# Patient Record
Sex: MALE | Race: WHITE | NOT HISPANIC OR LATINO | URBAN - METROPOLITAN AREA
[De-identification: names, ages, dates, MRNs, and addresses within clinical notes are randomized per-mention and may not be internally consistent; named-entity substitution may affect disease eponyms.]

---

## 2020-02-05 ENCOUNTER — EMERGENCY (EMERGENCY)
Facility: HOSPITAL | Age: 65
LOS: 0 days | Discharge: HOME | End: 2020-02-06
Attending: EMERGENCY MEDICINE | Admitting: EMERGENCY MEDICINE
Payer: MEDICARE

## 2020-02-05 VITALS
RESPIRATION RATE: 17 BRPM | HEART RATE: 64 BPM | DIASTOLIC BLOOD PRESSURE: 81 MMHG | TEMPERATURE: 98 F | OXYGEN SATURATION: 97 % | SYSTOLIC BLOOD PRESSURE: 186 MMHG

## 2020-02-05 DIAGNOSIS — M19.90 UNSPECIFIED OSTEOARTHRITIS, UNSPECIFIED SITE: ICD-10-CM

## 2020-02-05 DIAGNOSIS — M54.5 LOW BACK PAIN: ICD-10-CM

## 2020-02-05 DIAGNOSIS — I25.10 ATHEROSCLEROTIC HEART DISEASE OF NATIVE CORONARY ARTERY WITHOUT ANGINA PECTORIS: ICD-10-CM

## 2020-02-05 DIAGNOSIS — R10.9 UNSPECIFIED ABDOMINAL PAIN: ICD-10-CM

## 2020-02-05 DIAGNOSIS — E03.9 HYPOTHYROIDISM, UNSPECIFIED: ICD-10-CM

## 2020-02-05 DIAGNOSIS — I70.0 ATHEROSCLEROSIS OF AORTA: ICD-10-CM

## 2020-02-05 PROCEDURE — 99284 EMERGENCY DEPT VISIT MOD MDM: CPT

## 2020-02-05 RX ORDER — METHOCARBAMOL 500 MG/1
1000 TABLET, FILM COATED ORAL ONCE
Refills: 0 | Status: COMPLETED | OUTPATIENT
Start: 2020-02-05 | End: 2020-02-05

## 2020-02-05 RX ORDER — KETOROLAC TROMETHAMINE 30 MG/ML
30 SYRINGE (ML) INJECTION ONCE
Refills: 0 | Status: DISCONTINUED | OUTPATIENT
Start: 2020-02-05 | End: 2020-02-05

## 2020-02-05 RX ADMIN — Medication 30 MILLIGRAM(S): at 23:10

## 2020-02-06 LAB
APPEARANCE UR: CLEAR — SIGNIFICANT CHANGE UP
BACTERIA # UR AUTO: NEGATIVE — SIGNIFICANT CHANGE UP
BILIRUB UR-MCNC: NEGATIVE — SIGNIFICANT CHANGE UP
COLOR SPEC: YELLOW — SIGNIFICANT CHANGE UP
DIFF PNL FLD: NEGATIVE — SIGNIFICANT CHANGE UP
EPI CELLS # UR: 0 /HPF — SIGNIFICANT CHANGE UP (ref 0–5)
GLUCOSE UR QL: NEGATIVE — SIGNIFICANT CHANGE UP
HYALINE CASTS # UR AUTO: 0 /LPF — SIGNIFICANT CHANGE UP (ref 0–7)
KETONES UR-MCNC: NEGATIVE — SIGNIFICANT CHANGE UP
LEUKOCYTE ESTERASE UR-ACNC: NEGATIVE — SIGNIFICANT CHANGE UP
NITRITE UR-MCNC: NEGATIVE — SIGNIFICANT CHANGE UP
PH UR: 7 — SIGNIFICANT CHANGE UP (ref 5–8)
PROT UR-MCNC: ABNORMAL
RBC CASTS # UR COMP ASSIST: 1 /HPF — SIGNIFICANT CHANGE UP (ref 0–4)
SP GR SPEC: 1.03 — HIGH (ref 1.01–1.02)
UROBILINOGEN FLD QL: SIGNIFICANT CHANGE UP
WBC UR QL: 1 /HPF — SIGNIFICANT CHANGE UP (ref 0–5)

## 2020-02-06 PROCEDURE — 74176 CT ABD & PELVIS W/O CONTRAST: CPT | Mod: 26

## 2020-02-06 RX ORDER — IBUPROFEN 200 MG
1 TABLET ORAL
Qty: 12 | Refills: 0
Start: 2020-02-06 | End: 2020-02-08

## 2020-02-06 RX ORDER — METHOCARBAMOL 500 MG/1
2 TABLET, FILM COATED ORAL
Qty: 24 | Refills: 0
Start: 2020-02-06 | End: 2020-02-08

## 2020-02-06 RX ORDER — ACETAMINOPHEN 500 MG
975 TABLET ORAL ONCE
Refills: 0 | Status: DISCONTINUED | OUTPATIENT
Start: 2020-02-06 | End: 2020-02-06

## 2020-02-06 RX ADMIN — METHOCARBAMOL 1000 MILLIGRAM(S): 500 TABLET, FILM COATED ORAL at 01:55

## 2020-02-06 NOTE — ED PROVIDER NOTE - NS ED ROS FT
Constitutional: No fever, chills, unintended weight loss.  Eyes:  No visual changes, eye pain or discharge.  ENMT:  No hearing changes, pain, no sore throat or runny nose, no difficulty swallowing  Cardiac:  No chest pain, SOB or edema. No chest pain with exertion.  Respiratory:  No cough or respiratory distress. No hemoptysis. No history of asthma or RAD.  GI:  No nausea, vomiting, diarrhea or abdominal pain.  :  No dysuria, frequency or burning.  MS:  see hpi  Neuro:  No headache or weakness.  No LOC.  Skin:  No skin rash.   Endocrine: +hypothyroidism, no diabetes.

## 2020-02-06 NOTE — ED PROVIDER NOTE - CARE PROVIDER_API CALL
Angela,   your PCP/Endocrinologist Dr. Miller in [place]  Phone: (   )    -  Fax: (   )    -  Established Patient  Follow Up Time: 7-10 Days    [facility],   your Physical Therapist and Orthopedist at [facility] in [place]  Phone: (   )    -  Fax: (   )    -  Established Patient  Follow Up Time: 7-10 Days    Harvey Desai)  Surgery; Vascular Surgery  [address] [place] [address], [address]  [place] [address]  Phone: [phone]  Fax: [phone]  Follow Up Time: 7-10 Days

## 2020-02-06 NOTE — ED PROVIDER NOTE - MDM ORDERS SUBMITTED SELECTION
YOUR BABY AT BIRTH TO 2 WEEKS OF AGE    Key points at this age…     • Breast milk is the best nutrition.    • Car seats save lives--make sure to install and use them correctly!    • “Back to sleep” is recommended--it’s the safest sleeping position for your young infant!    FEEDING: Breast milk is the best food for Lacey at this age; it is beneficial in many ways to both babies and moms! It can be challenging early on, so ask for help from your doctors and the lactation specialists at the hospital. Breastfeeding moms should make sure their doctors know about any medications they are taking.     If you are bottle feeding, make sure to prepare formula exactly as directed (standard formula preparation is one scoop of formula in 2 ounces of water). You should hold your baby upright (not lying down) while feeding and never prop the bottle so that it stays in your baby’s mouth without you holding it (this can cause dangerous choking episodes as well as ear infections).     ABOUT VITAMIN D: All babies ( or formula fed) need extra vitamin D. Vitamin D is very important for bone health (especially preventing rickets) as well as other important health aspects. Breast milk is the “perfect” food except that it does not contain enough vitamin D for your baby. Formula has some vitamin D in it, but your baby needs extra until they are taking ~32-33 ounces of formula daily. We recommend 400 IU of vitamin D once daily. Vitamin D drops are available in two forms-- a concentrated drop which provides the full dose in one DROP or a liquid product which is dosed at one ml (or one cc-- the syringes provided with the drops are labeled with the correct dose). Make sure to follow the instructions on the bottle exactly for proper dosing! A  baby should continue this for as long as they are primarily breastfeeding; by the time a formula-fed baby goes through one bottle of vitamin D drops they are likely getting  enough vitamin D in their formula.     BEHAVIOR & CRYING: Touch and hold and carry your baby as often as you can. Cuddling, caressing, talking and singing to your baby makes them feel secure and loved. You will not spoil them when they are this young! Be sure to focus on them when you are feeding them; this is an important time for bonding and social development (don't be distracted by your cell phone or computer-- focus on your baby!). It’s also important to remember that other children in the house may be jealous of the new baby, and sometimes they may act out to get your attention. Try to set aside short periods of time every day devoted exclusively to your older child (or children). Also, try to find simple ways that they can help care for the baby-- this will help them connect better.      CAR SAFETY:  Your baby should be secured in a rear-facing infant car safety seat whenever riding in the car for at least the first year of life.This will protect your baby in case of an accident, plus it is the law. When choosing a car seat, you can check on any safety concerns for a particular model at www.safercar.gov. You can also search for local inspection stations on that site. (Or at www.safekidswi.org.) Correct installation of the car seat is critical (at least 7 out of 10 car seats are estimated to be incorrectly installed or used!). A certified car seat  can ensure your baby is as safely secured as possible. You should also register your car seat with the , so that you will be notified of any future problems or recalls with that seat. The straps need to be very snug to protect your baby in case of an accident (so no thick coats or snowsuits while riding in the car during the winter!). Never leave a child alone in a car, even for a very short time.      SAFE SLEEPING: The safest place for a  to sleep is in their own crib in their parent’s room; this is recommended until at least 6 months  of age. They should always be placed on their back to sleep (not on their tummy or their side). This “back to sleep” position decreases the risk of crib death (SIDS). Avoid loose, soft bedding (blankets, comforters, sheepskins, quilts, pillows, pillow-like bumper pads) and soft toys in the baby’s crib because they are a risk for suffocation (and SIDS). “Sleep sacks” and swaddling with thin blankets are okay. Cribs (including Pack-n-Plays) should be certified by [employer] [facility] (a safety agency for baby products). Safety criteria for cribs include: slats or bars no more than 2-3/8 inches (6cm) apart, a snug-fitting mattress, and a distance of no less than 26 inches from the mattress to the top rail. Sleeping in or with other devices (car seats, swings, bed sharing devices, cushions) are not recommended; likewise, sleeping on sofas or in armchairs is very dangerous for small infants.     TUMMY TIME: It is safe to start “tummy time” as soon as you bring your baby home. (Laying on your chest or across your lap counts!) It should always be done when your baby is awake. Start with a few minutes at a time, and increase it as your baby grows older. (Never leave your baby alone on their tummy.) Tummy time strengthens their neck and shoulder muscles and helps with their development. (It also prevents lopsided or flat heads that come from lying in the same position on their back all the time.)    OTHER SAFETY GUIDELINES:  Burns:  Adjust your hot-water heater to 120-125°F or use the “low” setting. This will decrease the risk of scald burns (and save money on your utility bills!).     Smoke and carbon monoxide detectors: Make sure that detectors are on each level of your home, especially near sleeping areas. Check the batteries regularly and replace them at least least twice a year. Discuss a “fire escape plan” with all family members.     No smoking! Exposure to tobacco smoke puts children at risk for lots of problems (asthma and other  severe breathing problems, crib death [SIDS], ear infections and even behavior and learning problems). If you smoke, this is the time to talk to your doctor about quitting. If you can’t quit, keep all smoking (including e-cigarettes) outside the home (not just in another room), and all smokers should change their clothes and wash their hands before handling the baby.     Tap water is safe! Tap water is safe for formula preparation. News reports in 2016 raised concerns about a risk of lead in the [place] city water supply. This water supply is very safe--there is no lead in the water that leaves the local water treatment center. In some older homes (built in ), there may be lead in the service pipe lines (which carry water from the main water pipe to individual homes). When water sits in these pipes for prolonged periods, some lead may dissolve into the water. As a precaution, the [facility] recommends a water filter at the tap of homes which have lead service lines. If you are not sure when your home was built, do an internet search for \"[place] lead awareness and drinking water safety\". From this web page, you can search for your address to find out if your home was built with lead service lines. There are also helpful hints regarding water safety on this site. Another option is to call the Customer Service line at [phone].    OTHER  ISSUES:   Body temperature: A rectal temperature is most accurate way to check for fever in this age group. Normal rectal temperatures range between 97.9 and 100.3°F. A fever is defined as a rectal temperature of 100.4°F or higher. Call the doctor or have Lacey seen if they have a fever. In a baby this young, a fever needs to be evaluated. Do not give Tylenol or ibuprofen at this age.     Bowel movements:  Once your baby is feeding well, how often they poop may vary from once every feeding to once every three to four days ( babies often poop less  than once a day).  It is normal for babies to strain and turn a little red in the face with bowel movements, as long as the stool they pass is soft.  If you feel Lacey is very uncomfortable, call the clinic. Blood in the stool may be normal early on in nursing infants, but you should call us to discuss it if you do see any blood.    Follow up visits: After the  period, we usually recommend your baby be seen at 2 weeks of age, and then at two months of age. Of course, we will see you anytime for any concerns and to follow up on any problems.     Thank you for entrusting your care to [facility]!    Also, check out “Children’s Health” on the [facility] Blog for updates on timely topics regarding children’s health!             Medications/Imaging Studies

## 2020-02-06 NOTE — ED PROVIDER NOTE - CARE PLAN
Principal Discharge DX:	Flank pain  Secondary Diagnosis:	Coronary artery calcification  Secondary Diagnosis:	Atherosclerosis of aorta  Secondary Diagnosis:	Arthritis

## 2020-02-06 NOTE — ED ADULT NURSE NOTE - OBJECTIVE STATEMENT
pt aox4 complaining of left sided flank pain. ua/ uc sent. pt given medications as per order. VSS ok for dc with follow up.

## 2020-02-06 NOTE — ED PROVIDER NOTE - PROVIDER TOKENS
FREE:[LAST:[[facility]],PHONE:[(   )    -],FAX:[(   )    -],ADDRESS:[your PCP/Endocrinologist Dr. Miller in [place]],FOLLOWUP:[7-10 Days],ESTABLISHEDPATIENT:[T]],FREE:[LAST:[[facility]],PHONE:[(   )    -],FAX:[(   )    -],ADDRESS:[your Physical Therapist and Orthopedist at [facility] in [place]],FOLLOWUP:[7-10 Days],ESTABLISHEDPATIENT:[T]],PROVIDER:[TOKEN:[[id number]],FOLLOWUP:[7-10 Days]]

## 2020-02-06 NOTE — ED PROVIDER NOTE - CARE PROVIDERS DIRECT ADDRESSES
,DirectAddress_Unknown,DirectAddress_Unknown,[email or web address]

## 2020-02-06 NOTE — ED PROVIDER NOTE - OBJECTIVE STATEMENT
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 Mid/lower L back pain, aching, worse w/mvmt, relieved by rest. Admits to some heavy lifting lately, however was concerned he might have a kidney stone. No h/o same. No f/c, nv, abd pain, urinary sx, saddle anesthesia, b/b incontinence, focal numbness or weakness. PMD in [place].

## 2020-02-06 NOTE — ED PROVIDER NOTE - PHYSICAL EXAMINATION
VITAL SIGNS: I have reviewed nursing notes and confirm.  CONSTITUTIONAL: Well-developed; well-nourished; in no acute distress.  SKIN: Skin exam is warm and dry, no acute rash.  HEAD: Normocephalic; atraumatic.  EYES: PERRL, EOM intact; conjunctiva and sclera clear.  ENT: No nasal discharge; airway clear.  NECK: Supple; non tender.  CARD: S1, S2 normal; no murmurs, gallops, or rubs. Regular rate and rhythm.  RESP: No wheezes, rales or rhonchi.  ABD: Normal bowel sounds; soft; non-distended; non-tender; no hepatosplenomegaly; no rgr.  BACK: +L paraT/L spine ttp, no midline ttp, no CVAT  EXT: Normal ROM. No clubbing, cyanosis or edema. DPI.  NEURO: aaox3, cn 2-12 intact bl no nystagmus or facial droop, 5/5 strength x 4 no drift, gross sensation intact, finger-nose nl, gait nl, romberg neg   PSYCH: Cooperative, appropriate.

## 2020-02-06 NOTE — ED PROVIDER NOTE - CLINICAL SUMMARY MEDICAL DECISION MAKING FREE TEXT BOX
L flank pain, reproducible, ua no hematuria, CT AP with degenerative spine & other incidental findings, analgesia provided w/improvement - all results d/w pt & copies given, strict return precautions discussed, rec outpt private PT/Ortho f/u as established, PMD f/u, Cardio/Vasc referrals given for incidental atherosclerosis findings on CT

## 2020-02-06 NOTE — ED PROVIDER NOTE - PATIENT PORTAL LINK FT
You can access the FollowMyHealth Patient Portal offered by [facility] by registering at the following website: [email or web address]. By joining [facility]’s FollowMyHealth portal, you will also be able to view your health information using other applications (apps) compatible with our system.

## 2020-02-06 NOTE — ED PROVIDER NOTE - NSFOLLOWUPCLINICS_GEN_ALL_ED_FT
[facility] [address] [address]  Phone: [phone]  Fax:   Follow Up Time: 7-10 Days

## 2020-02-07 LAB
CULTURE RESULTS: NO GROWTH — SIGNIFICANT CHANGE UP
SPECIMEN SOURCE: SIGNIFICANT CHANGE UP

## 2021-07-31 ENCOUNTER — EMERGENCY (EMERGENCY)
Facility: HOSPITAL | Age: 66
LOS: 0 days | Discharge: HOME | End: 2021-07-31
Attending: EMERGENCY MEDICINE | Admitting: EMERGENCY MEDICINE
Payer: MEDICARE

## 2021-07-31 VITALS
WEIGHT: 195.11 LBS | HEART RATE: 76 BPM | OXYGEN SATURATION: 98 % | SYSTOLIC BLOOD PRESSURE: 161 MMHG | TEMPERATURE: 98 F | DIASTOLIC BLOOD PRESSURE: 100 MMHG | RESPIRATION RATE: 18 BRPM

## 2021-07-31 DIAGNOSIS — Z87.19 PERSONAL HISTORY OF OTHER DISEASES OF THE DIGESTIVE SYSTEM: ICD-10-CM

## 2021-07-31 DIAGNOSIS — R10.12 LEFT UPPER QUADRANT PAIN: ICD-10-CM

## 2021-07-31 DIAGNOSIS — E03.9 HYPOTHYROIDISM, UNSPECIFIED: ICD-10-CM

## 2021-07-31 DIAGNOSIS — R14.2 ERUCTATION: ICD-10-CM

## 2021-07-31 DIAGNOSIS — F17.210 NICOTINE DEPENDENCE, CIGARETTES, UNCOMPLICATED: ICD-10-CM

## 2021-07-31 DIAGNOSIS — Z85.72 PERSONAL HISTORY OF NON-HODGKIN LYMPHOMAS: ICD-10-CM

## 2021-07-31 LAB
ALBUMIN SERPL ELPH-MCNC: 5.1 G/DL — SIGNIFICANT CHANGE UP (ref 3.5–5.2)
ALP SERPL-CCNC: 124 U/L — HIGH (ref 30–115)
ALT FLD-CCNC: 31 U/L — SIGNIFICANT CHANGE UP (ref 0–41)
ANION GAP SERPL CALC-SCNC: 11 MMOL/L — SIGNIFICANT CHANGE UP (ref 7–14)
AST SERPL-CCNC: 26 U/L — SIGNIFICANT CHANGE UP (ref 0–41)
BASOPHILS # BLD AUTO: 0.07 K/UL — SIGNIFICANT CHANGE UP (ref 0–0.2)
BASOPHILS NFR BLD AUTO: 0.6 % — SIGNIFICANT CHANGE UP (ref 0–1)
BILIRUB SERPL-MCNC: 0.4 MG/DL — SIGNIFICANT CHANGE UP (ref 0.2–1.2)
BUN SERPL-MCNC: 22 MG/DL — HIGH (ref 10–20)
CALCIUM SERPL-MCNC: 10.6 MG/DL — HIGH (ref 8.5–10.1)
CHLORIDE SERPL-SCNC: 105 MMOL/L — SIGNIFICANT CHANGE UP (ref 98–110)
CO2 SERPL-SCNC: 27 MMOL/L — SIGNIFICANT CHANGE UP (ref 17–32)
CREAT SERPL-MCNC: 0.9 MG/DL — SIGNIFICANT CHANGE UP (ref 0.7–1.5)
EOSINOPHIL # BLD AUTO: 0.12 K/UL — SIGNIFICANT CHANGE UP (ref 0–0.7)
EOSINOPHIL NFR BLD AUTO: 1 % — SIGNIFICANT CHANGE UP (ref 0–8)
GLUCOSE SERPL-MCNC: 114 MG/DL — HIGH (ref 70–99)
HCT VFR BLD CALC: 51.9 % — SIGNIFICANT CHANGE UP (ref 42–52)
HGB BLD-MCNC: 17.4 G/DL — SIGNIFICANT CHANGE UP (ref 14–18)
IMM GRANULOCYTES NFR BLD AUTO: 0.4 % — HIGH (ref 0.1–0.3)
LIDOCAIN IGE QN: 24 U/L — SIGNIFICANT CHANGE UP (ref 7–60)
LYMPHOCYTES # BLD AUTO: 26.6 % — SIGNIFICANT CHANGE UP (ref 20.5–51.1)
LYMPHOCYTES # BLD AUTO: 3.17 K/UL — SIGNIFICANT CHANGE UP (ref 1.2–3.4)
MCHC RBC-ENTMCNC: 30.3 PG — SIGNIFICANT CHANGE UP (ref 27–31)
MCHC RBC-ENTMCNC: 33.5 G/DL — SIGNIFICANT CHANGE UP (ref 32–37)
MCV RBC AUTO: 90.3 FL — SIGNIFICANT CHANGE UP (ref 80–94)
MONOCYTES # BLD AUTO: 1.05 K/UL — HIGH (ref 0.1–0.6)
MONOCYTES NFR BLD AUTO: 8.8 % — SIGNIFICANT CHANGE UP (ref 1.7–9.3)
NEUTROPHILS # BLD AUTO: 7.44 K/UL — HIGH (ref 1.4–6.5)
NEUTROPHILS NFR BLD AUTO: 62.6 % — SIGNIFICANT CHANGE UP (ref 42.2–75.2)
NRBC # BLD: 0 /100 WBCS — SIGNIFICANT CHANGE UP (ref 0–0)
PLATELET # BLD AUTO: 197 K/UL — SIGNIFICANT CHANGE UP (ref 130–400)
POTASSIUM SERPL-MCNC: 5.1 MMOL/L — HIGH (ref 3.5–5)
POTASSIUM SERPL-SCNC: 5.1 MMOL/L — HIGH (ref 3.5–5)
PROT SERPL-MCNC: 7.8 G/DL — SIGNIFICANT CHANGE UP (ref 6–8)
RBC # BLD: 5.75 M/UL — SIGNIFICANT CHANGE UP (ref 4.7–6.1)
RBC # FLD: 14.9 % — HIGH (ref 11.5–14.5)
SODIUM SERPL-SCNC: 143 MMOL/L — SIGNIFICANT CHANGE UP (ref 135–146)
WBC # BLD: 11.9 K/UL — HIGH (ref 4.8–10.8)
WBC # FLD AUTO: 11.9 K/UL — HIGH (ref 4.8–10.8)

## 2021-07-31 PROCEDURE — 99284 EMERGENCY DEPT VISIT MOD MDM: CPT

## 2021-07-31 PROCEDURE — 74022 RADEX COMPL AQT ABD SERIES: CPT | Mod: 26

## 2021-07-31 RX ORDER — FAMOTIDINE 10 MG/ML
20 INJECTION INTRAVENOUS ONCE
Refills: 0 | Status: COMPLETED | OUTPATIENT
Start: 2021-07-31 | End: 2021-07-31

## 2021-07-31 RX ORDER — DIPHENHYDRAMINE HYDROCHLORIDE AND LIDOCAINE HYDROCHLORIDE AND ALUMINUM HYDROXIDE AND MAGNESIUM HYDRO
30 KIT ONCE
Refills: 0 | Status: COMPLETED | OUTPATIENT
Start: 2021-07-31 | End: 2021-07-31

## 2021-07-31 RX ORDER — SODIUM CHLORIDE 9 MG/ML
1000 INJECTION INTRAMUSCULAR; INTRAVENOUS; SUBCUTANEOUS ONCE
Refills: 0 | Status: COMPLETED | OUTPATIENT
Start: 2021-07-31 | End: 2021-07-31

## 2021-07-31 RX ADMIN — FAMOTIDINE 100 MILLIGRAM(S): 10 INJECTION INTRAVENOUS at 20:50

## 2021-07-31 RX ADMIN — DIPHENHYDRAMINE HYDROCHLORIDE AND LIDOCAINE HYDROCHLORIDE AND ALUMINUM HYDROXIDE AND MAGNESIUM HYDRO 30 MILLILITER(S): KIT at 20:55

## 2021-07-31 RX ADMIN — SODIUM CHLORIDE 1000 MILLILITER(S): 9 INJECTION INTRAMUSCULAR; INTRAVENOUS; SUBCUTANEOUS at 20:51

## 2021-07-31 NOTE — ED PROVIDER NOTE - PHYSICAL EXAMINATION
CONSTITUTIONAL: Well-appearing; well-nourished; in no apparent distress.   EYES: PERRL; EOM intact.   CARDIOVASCULAR: Normal S1, S2; no murmurs, rubs, or gallops.   RESPIRATORY: Normal chest excursion with respiration; breath sounds clear and equal bilaterally; no wheezes, rhonchi, or rales.  GI/: Normal bowel sounds; soft; non-distended; non-tender; no palpable organomegaly.   MS: No calf swelling and tenderness.  SKIN: Normal for age and race; warm; dry; good turgor; no apparent lesions or exudate.   NEURO/PSYCH: A & O x 4; grossly unremarkable.

## 2021-07-31 NOTE — ED PROVIDER NOTE - CLINICAL SUMMARY MEDICAL DECISION MAKING FREE TEXT BOX
Labs unremarkable.  AXR non-obstructive pattern.  Asymptomatic in ED. Abdomen soft and non-tender.  Will D/C to follow up with PCP.  Instructed to return if symptoms worsen.

## 2021-07-31 NOTE — ED PROVIDER NOTE - PROGRESS NOTE DETAILS
Feeling better. abdomen is soft and non-tender. xray found bowel gas throughout without distension. no re-exam - prior to dc continue to be soft and non-tender without distension. encourage return to ED for worsening sxs.

## 2021-07-31 NOTE — ED PROVIDER NOTE - NS ED MD DISPO DISCHARGE CCDA
Patient/Caregiver provided printed discharge information.
hand grasp, leg strength strong and equal bilaterally

## 2021-07-31 NOTE — ED PROVIDER NOTE - PATIENT PORTAL LINK FT
You can access the FollowMyHealth Patient Portal offered by [facility] by registering at the following website: [email or web address]. By joining [facility]’s FollowMyHealth portal, you will also be able to view your health information using other applications (apps) compatible with our system.

## 2021-07-31 NOTE — ED PROVIDER NOTE - OBJECTIVE STATEMENT
65 yo male hx of hypothyroid/ B-cell lymphoma (s/p chest and abdominal tumor resection) present c/o LUQ pain for few hours. reports he changed to Akin's diet and has been a lot of Figs in his diet. felt LUQ pain/pressure tonight and didn't get improve so he came to ED for evaluation. reported he had multiple burping prior to arrival and his pain has improved to evaluation. denies fever/chill/chest pain/sob/vomiting/diarrhea. has been passing gas and moved his bowel earlier in the afternoon.

## 2021-07-31 NOTE — ED PROVIDER NOTE - ATTENDING CONTRIBUTION TO CARE
I personally evaluated the patient. I reviewed the Resident’s or Physician Assistant’s note (as assigned above), and agree with the findings and plan except as documented in my note.  Chart reviewed.  H/O lymphoma, bowel obstruction S/P resection, presents with LUQ pain and belching after eating figs.  Feels better now.  Last BM today.  Exam shows alert patient in no distress, HEENT NCAT, lungs clear, RR S1S2, abdomen soft NT non-distended, +BS, no rebound or guarding, no CCE.

## 2021-11-03 NOTE — ED PROVIDER NOTE - IV ALTEPLASE DOOR HIDDEN
Chang Nicole: still has diminished breath sounds on right, no symptoms though. Do you want to get a CXR? He had pneumonia over the summer.
show

## 2022-07-17 ENCOUNTER — EMERGENCY (EMERGENCY)
Facility: HOSPITAL | Age: 67
LOS: 0 days | Discharge: HOME | End: 2022-07-17
Attending: EMERGENCY MEDICINE | Admitting: EMERGENCY MEDICINE

## 2022-07-17 VITALS
DIASTOLIC BLOOD PRESSURE: 76 MMHG | OXYGEN SATURATION: 98 % | TEMPERATURE: 98 F | SYSTOLIC BLOOD PRESSURE: 148 MMHG | HEART RATE: 71 BPM | RESPIRATION RATE: 18 BRPM

## 2022-07-17 VITALS — WEIGHT: 175.05 LBS

## 2022-07-17 DIAGNOSIS — K59.00 CONSTIPATION, UNSPECIFIED: ICD-10-CM

## 2022-07-17 DIAGNOSIS — R10.9 UNSPECIFIED ABDOMINAL PAIN: ICD-10-CM

## 2022-07-17 DIAGNOSIS — Z85.72 PERSONAL HISTORY OF NON-HODGKIN LYMPHOMAS: ICD-10-CM

## 2022-07-17 DIAGNOSIS — F17.200 NICOTINE DEPENDENCE, UNSPECIFIED, UNCOMPLICATED: ICD-10-CM

## 2022-07-17 DIAGNOSIS — E03.9 HYPOTHYROIDISM, UNSPECIFIED: ICD-10-CM

## 2022-07-17 PROCEDURE — 99283 EMERGENCY DEPT VISIT LOW MDM: CPT | Mod: FS

## 2022-07-17 RX ORDER — LIOTHYRONINE SODIUM 25 UG/1
0 TABLET ORAL
Qty: 0 | Refills: 0 | DISCHARGE

## 2022-07-17 RX ORDER — THYROID 120 MG
1 TABLET ORAL
Qty: 0 | Refills: 0 | DISCHARGE

## 2022-07-17 NOTE — ED PROVIDER NOTE - OBJECTIVE STATEMENT
67-year-old male history of lymphoma, intestinal obstructions complaining of constipation and abdominal pain.  Patient states that he has been on a keto diet for the past year and sometimes has constipation.  Today patient was unable to have a bowel movement and took Milk of Magnesia  and had a Fleet enema. He  was still unable to have a bowel movement at home so he came to the ED.  While in ED patient had a  large bowel movement and feels much better and is ready to go home.

## 2022-07-17 NOTE — ED PROVIDER NOTE - NS ED ATTENDING STATEMENT MOD
This was a shared visit with the VINAY. I reviewed and verified the documentation and independently performed the documented:

## 2022-07-17 NOTE — ED PROVIDER NOTE - NS ED ROS FT
Review of Systems    Constitutional: (-) fever, (-) chills  Eyes/ENT: (-) blurry vision, (-) epistaxis, (-) sore throat  Cardiovascular: (-) chest pain, (-) syncope  Respiratory: (-) cough, (-) shortness of breath  Gastrointestinal: (+) pain, (-) nausea, (-) vomiting, (-) diarrhea, (+) constipation  Musculoskeletal: (-) neck pain, (-) back pain, (-) body aches  Integumentary: (-) rash, (-) edema  Neurological: (-) headache, (-) altered mental status

## 2022-07-17 NOTE — ED PROVIDER NOTE - PATIENT PORTAL LINK FT
You can access the FollowMyHealth Patient Portal offered by [facility] by registering at the following website: [email or web address]. By joining [employer]’s FollowMyHealth portal, you will also be able to view your health information using other applications (apps) compatible with our system.

## 2022-07-17 NOTE — ED PROVIDER NOTE - CLINICAL SUMMARY MEDICAL DECISION MAKING FREE TEXT BOX
Patient presented with abdominal pain and constipation which she states has been going on for years.  States today he was having some issues using the bathroom and then took milk of magnesia which did not work so he came to the ED.  On arrival to ED, patient use the bathroom and was able to have a large bowel movement and now is completely asymptomatic.  Afebrile, hemodynamically stable, abdomen completely nontender.  Patient ambulatory and requesting to leave at this time.  Given the above, will discharge home with outpatient follow up. Patient agreeable with plan. Agrees to return to ED for any new or worsening symptoms.

## 2024-07-25 ENCOUNTER — EMERGENCY (EMERGENCY)
Facility: HOSPITAL | Age: 69
LOS: 1 days | Discharge: HOME | End: 2024-07-25
Attending: EMERGENCY MEDICINE | Admitting: EMERGENCY MEDICINE
Payer: MEDICARE

## 2024-07-25 VITALS
TEMPERATURE: 98 F | HEART RATE: 76 BPM | DIASTOLIC BLOOD PRESSURE: 79 MMHG | OXYGEN SATURATION: 99 % | SYSTOLIC BLOOD PRESSURE: 154 MMHG | RESPIRATION RATE: 16 BRPM

## 2024-07-25 VITALS
HEART RATE: 84 BPM | OXYGEN SATURATION: 96 % | RESPIRATION RATE: 16 BRPM | DIASTOLIC BLOOD PRESSURE: 116 MMHG | TEMPERATURE: 98 F | SYSTOLIC BLOOD PRESSURE: 171 MMHG

## 2024-07-25 DIAGNOSIS — Z23 ENCOUNTER FOR IMMUNIZATION: ICD-10-CM

## 2024-07-25 DIAGNOSIS — Z88.5 ALLERGY STATUS TO NARCOTIC AGENT: ICD-10-CM
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
## 2024-07-25 DIAGNOSIS — S61.212A LACERATION WITHOUT FOREIGN BODY OF RIGHT MIDDLE FINGER WITHOUT DAMAGE TO NAIL, INITIAL ENCOUNTER: ICD-10-CM

## 2024-07-25 DIAGNOSIS — Z85.72 PERSONAL HISTORY OF NON-HODGKIN LYMPHOMAS: ICD-10-CM
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
## 2024-07-25 PROCEDURE — 12002 RPR S/N/AX/GEN/TRNK2.6-7.5CM: CPT

## 2024-07-25 PROCEDURE — 99284 EMERGENCY DEPT VISIT MOD MDM: CPT | Mod: 25

## 2024-07-25 PROCEDURE — 73140 X-RAY EXAM OF FINGER(S): CPT | Mod: 26,RT

## 2024-07-25 RX ORDER — TETANUS TOXOID, REDUCED DIPHTHERIA TOXOID AND ACELLULAR PERTUSSIS VACCINE, ADSORBED 5; 2.5; 8; 8; 2.5 [IU]/.5ML; [IU]/.5ML; UG/.5ML; UG/.5ML; UG/.5ML
0.5 SUSPENSION INTRAMUSCULAR ONCE
Refills: 0 | Status: COMPLETED | OUTPATIENT
Start: 2024-07-25 | End: 2024-07-25

## 2024-07-25 RX ORDER — CEFADROXIL 500 MG
1 CAPSULE ORAL
Qty: 5 | Refills: 0
Start: 2024-07-25 | End: 2024-07-29

## 2024-07-25 RX ADMIN — TETANUS TOXOID, REDUCED DIPHTHERIA TOXOID AND ACELLULAR PERTUSSIS VACCINE, ADSORBED 0.5 MILLILITER(S): 5; 2.5; 8; 8; 2.5 SUSPENSION INTRAMUSCULAR at 13:54

## 2024-07-25 NOTE — ED ADULT NURSE REASSESSMENT NOTE - NS ED NURSE REASSESS COMMENT FT1
Pt dc and all education given and gone over with pt. Pt has no further questions and self ambulating out of ed with all belongings and ppwrk. Verbal confirmation that pt understands  All information including refferals, resources, recomendations and  medications (if applicable) gone over with pateint.  Pt not with any current complaints and reports feeling great,   Care complete
RN spoke with Anil ROQUE, pt affected finger soaked in solution of saline and iodine  Pt medicated as ordered. All medication education given to patient and pt understands. Plan of care ongoing  Pt in chair, in nad, no current complaints  Plan of care ongoing
Hrvoje

## 2024-07-25 NOTE — ED PROVIDER NOTE - NSFOLLOWUPINSTRUCTIONS_ED_ALL_ED_FT
You were seen in the ED for finger laceration    Your evaluation including x ray showed no findings requiring further evaluation or treatment in the hospital at this time.    Your laceration was repaired with Steri-Strips and 4 sutures.  Please keep these clean, and in the splint as you have been shown.  You may have a small fracture at the base of the bone for which you should wear the splint.    Please follow up with your PCP as discussed within 1 week. Discuss your symptoms, medications, and results in this packet.    Please also follow-up with hand surgeon Dr. Amato.  See the contact information included below to schedule an appointment within 5 days.    You are prescribed an antibiotic to take called cefadroxil.  Please take 1 tablet once per day for 5 days.    Seek immediate medical attention if you experience new or worsening symptoms, including New numbness or tingling significant bleeding or pus drainage from the wound, fevers with worsening pain and swelling to the wound, inability to feel or move the affected finger.

## 2024-07-25 NOTE — ED PROVIDER NOTE - PATIENT PORTAL LINK FT
You can access the FollowMyHealth Patient Portal offered by [facility] by registering at the following website: [email or web address]. By joining [facility]’s FollowMyHealth portal, you will also be able to view your health information using other applications (apps) compatible with our system.

## 2024-07-25 NOTE — ED PROVIDER NOTE - PROGRESS NOTE DETAILS
Juliano Harris MD PGY3: Xray read as possible small avulsion fracture to base of DIP. Discussed with hand, repaired as recommended with suture and steri strips. To give abx as recommended. Splinted finger. Discussed with patient all results including any incidentals. Discussed discharge, follow up, return precautions, medications. Patient verbalizes understanding and is amenable at this time. Answered patient questions.

## 2024-07-25 NOTE — ED ADULT NURSE NOTE - OBJECTIVE STATEMENT
Pt presents to ed, ambulatory Pt AOX4, speaking in full clear sentences, breathing equal and unlabored  Pt with laceration to right 3rd digit from a saw 40 min PTA. Pt unsure of last tdap. No AC use. Bleeding controlled at present. Pt hypertensive, states he is very stressed right now. Hx hypothyroid, on synthroid  No other HAM reported but reports after right lung tumor resection surgery 3 months ago he developed HTN but currently does not take meds  Neg SI/HI  No further DCAPBTLS reported or observed  Plan of care ongoing

## 2024-07-25 NOTE — ED PROVIDER NOTE - CARE PROVIDERS DIRECT ADDRESSES
[FreeTextEntry1] : Documented by Manpreet Awan acting as a scribe for Dr. Koko Montoya on 10/14/2021. All medical record entries made by the Scribe were at my, Dr. Koko Montoya, direction and personally dictated by me on 10/14/2021 . I have reviewed the chart and agree that the record accurately reflects my personal performance of the history, physical exam, assessment and plan. I have also personally directed, reviewed, and agreed with the chart.  [email or web address]

## 2024-07-25 NOTE — ED PROVIDER NOTE - OBJECTIVE STATEMENT
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  Patient is left-handed.  Approximately 1 hour prior to arrival patient was helping his friend cut wood with a reciprocating saw, hit his right middle finger on the nonsharp part of the blade.  Had immediate pain and bleeding from that site.  States that his pain has been improved, no analgesia taken.  Still able to move the finger to flexion extension and feel the finger.  No injuries elsewhere.  Unknown last tetanus.  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

## 2024-07-25 NOTE — ED PROVIDER NOTE - CARE PROVIDER_API CALL
Pj Amato  Plastic Surgery  [address], [address]  [place] [address]  Phone: [phone]  Fax: [phone]  Follow Up Time: 4-6 Days

## 2024-07-25 NOTE — ED PROVIDER NOTE - CLINICAL SUMMARY MEDICAL DECISION MAKING FREE TEXT BOX
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 With vital signs initially hypertensive to 170s over 110s with patient mildly anxious, otherwise within norm limits and stable.  Presenting with right third finger laceration that is fairly deep but not involving the nail and without any findings suggestive of flexor or extensor injury or neurovascular compromise.  To update tetanus, x-ray given wound is fairly deep, soaked in saline and Betadine, repair wound. Declines analgesia at this time.

## 2024-07-25 NOTE — ED PROVIDER NOTE - ATTENDING CONTRIBUTION TO CARE
Trey Jeff is a 69-year-old male who was seen & examined w/ the resident.  The patient presented with a laceration to his right middle finger. 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 Approximately an hour ago, while assisting a friend in cutting wood, he accidentally injured his right middle finger with the back of a reciprocating saw, resulting in a laceration. Initially, he experienced bleeding and pain, but both have subsided slightly since the incident. He reports being able to feel and move the affected finger appropriately. His last tetanus shot is unknown.     On our exam, there is a 3 cm curvilinear laceration to lateral aspect of 3rd right finger pad just anterior to nail bed without nail involvement

## 2024-07-25 NOTE — ED PROVIDER NOTE - PHYSICAL EXAMINATION
CONSTITUTIONAL: awake, mildly anxious, in no acute distress.   SKIN: 3cm curvilinear laceration to lateral aspect of 3rd R finger pad just anterior to nail bed without nail involvement  HEAD: Normocephalic; atraumatic.  EYES: no conjunctival injection. no scleral icterus  ENT: No nasal discharge; airway clear.  CARD: S1, S2 normal; no murmurs, gallops, or rubs. Regular rate and rhythm.   RESP: No wheezes, rales or rhonchi. Good air movement bilaterally.   ABD: soft ntnd, no guarding, no distention, no rigidity.   EXT:   No cyanosis or edema. skin as above. R 3rd finger good cap refill, good flexion/extension, good sensation  NEURO: Alert, oriented, grossly unremarkable  PSYCH: Cooperative, appropriate.

## 2024-07-25 NOTE — ED ADULT TRIAGE NOTE - CHIEF COMPLAINT QUOTE
Pt with laceration to right 3rd digit from a saw. Pt unsure of last tdap. No AC use. Bleeding controlled at present. Pt hypertensive, states he is very stressed right now.

## 2024-07-26 PROBLEM — C85.90 NON-HODGKIN LYMPHOMA, UNSPECIFIED, UNSPECIFIED SITE: Chronic | Status: ACTIVE | Noted: 2022-07-17

## 2024-08-01 ENCOUNTER — APPOINTMENT (OUTPATIENT)
Age: 69
End: 2024-08-01
Payer: MEDICARE

## 2024-08-01 VITALS — RESPIRATION RATE: 16 BRPM | HEIGHT: 72 IN | WEIGHT: 195 LBS | BODY MASS INDEX: 26.41 KG/M2

## 2024-08-01 DIAGNOSIS — Z87.891 PERSONAL HISTORY OF NICOTINE DEPENDENCE: ICD-10-CM

## 2024-08-01 DIAGNOSIS — S61.212D LACERATION W/OUT FOREIGN BODY OF RIGHT MIDDLE FINGER W/OUT DAMAGE TO NAIL, SUBSEQUENT ENCOUNTER: ICD-10-CM

## 2024-08-01 PROCEDURE — 99204 OFFICE O/P NEW MOD 45 MIN: CPT

## 2024-08-01 PROCEDURE — 73140 X-RAY EXAM OF FINGER(S): CPT | Mod: F7

## 2024-08-01 NOTE — PHYSICAL EXAM
[de-identified] : There is a laceration on the ulnar aspect of the right third digit overlying the ulnar neurovascular bundle.  There is no evidence of infection.  The FDS FDP and extensor mechanism is appears to be intact but there is limited range of motion secondary to swelling and discomfort.  There is good circulation but a positive Tinel's over the laceration point over the ulnar aspect of the left third digit at the level of the DIP joint.  Sensation is 5 mm on the radial aspect and greater than 15 mm on the ulnar aspect. [de-identified] : PA lateral and oblique of the right third digit shows mild degenerative changes over the DIP joint but no evidence of fractures or dislocations.

## 2024-08-01 NOTE — HISTORY OF PRESENT ILLNESS
[Left] : left hand dominant [FreeTextEntry1] : Date of injury: 07/25/2024 (1 week status post injury)  Patient presents today for evaluation of laceration of the right third digit which he sustained from cutting a piece of wood.  Patient was seen at [facility] for this where he got x-rays showing no fracture.  He was stitched up and he is on antibiotics.  He was given a finger splint.  Patient complains of mild tingling and numbness of the right DIP joint of the third finger.

## 2024-08-01 NOTE — ASSESSMENT
[FreeTextEntry1] : 1 week status post right ulnar digital nerve laceration at the DIP crease.  The risks, benefits, alternatives and associated differential diagnosis was discussed with the patient. Options ranged from conservative care, therapy to surgical intervention were reviewed and all questions answered. Risks included incomplete resolution of symptoms, worsening of symptoms recurrence, tissue loss, functional loss and other risks associated with treatment of this condition Patient appeared to have an excellent understanding of the risks as well as differential diagnosis associated with this condition.  Patient would like to proceed with right third digit exploration with microscopic ulnar digital nerve repair possible neuro tube or allograft.

## 2024-08-13 ENCOUNTER — TRANSCRIPTION ENCOUNTER (OUTPATIENT)
Age: 69
End: 2024-08-13

## 2024-08-13 RX ORDER — CHLORHEXIDINE GLUCONATE 500 MG/1
1 CLOTH TOPICAL DAILY
Refills: 0 | Status: DISCONTINUED | OUTPATIENT
Start: 2024-08-14 | End: 2024-08-14

## 2024-08-13 RX ORDER — CEPHALEXIN 500 MG/1
500 CAPSULE ORAL 3 TIMES DAILY
Qty: 21 | Refills: 0 | Status: ACTIVE | COMMUNITY
Start: 2024-08-13 | End: 1900-01-01

## 2024-08-13 RX ORDER — HYDROCODONE BITARTRATE AND ACETAMINOPHEN 5; 325 MG/1; MG/1
5-325 TABLET ORAL
Qty: 20 | Refills: 0 | Status: ACTIVE | COMMUNITY
Start: 2024-08-13 | End: 1900-01-01

## 2024-08-13 NOTE — ASU DISCHARGE PLAN (ADULT/PEDIATRIC) - CARE PROVIDER_API CALL
Nba Howard  Surgery of the Hand  [address]  [place] [address]  Phone: [phone]  Fax: [phone]  Established Patient  Follow Up Time:

## 2024-08-13 NOTE — ASU PATIENT PROFILE, ADULT - FALL HARM RISK - UNIVERSAL INTERVENTIONS
Bed in lowest position, wheels locked, appropriate side rails in place/Call bell, personal items and telephone in reach/Instruct patient to call for assistance before getting out of bed or chair/Non-slip footwear when patient is out of bed/[place] to call system/Physically safe environment - no spills, clutter or unnecessary equipment/Purposeful Proactive Rounding/Room/bathroom lighting operational, light cord in reach

## 2024-08-13 NOTE — ASU DISCHARGE PLAN (ADULT/PEDIATRIC) - BATHING
Keep Dry/No change Glycopyrrolate Pregnancy And Lactation Text: This medication is Pregnancy Category B and is considered safe during pregnancy. It is unknown if it is excreted breast milk.

## 2024-08-13 NOTE — BRIEF OPERATIVE NOTE - NSICDXBRIEFPOSTOP_GEN_ALL_CORE_FT
POST-OP DIAGNOSIS:  Laceration of digital nerve of finger 13-Aug-2024 07:32:57  Prashanth Campbell

## 2024-08-13 NOTE — ASU PATIENT PROFILE, ADULT - NSICDXPASTMEDICALHX_GEN_ALL_CORE_FT
PAST MEDICAL HISTORY:  Atrial fibrillation resolved with thyroid treatment    Hypertension     Hypothyroid     Non-Hodgkins lymphoma

## 2024-08-13 NOTE — ASU DISCHARGE PLAN (ADULT/PEDIATRIC) - NS MD DC FALL RISK RISK
For information on Fall & Injury Prevention, visit: [email or web address] OR  [email or web address] OR  https://www.cdc.gov/steadi/patient.html

## 2024-08-13 NOTE — ASU DISCHARGE PLAN (ADULT/PEDIATRIC) - ASU DC SPECIAL INSTRUCTIONSFT
Co-Directors: Nba Howard MD; Mandeep Goldberg MD; Branden Garvin MD   [facility] [address] 	  [place] [address] 	 	  Phone [phone] (HAND), Fax [phone]    [email or web address]    Hand Surgery Post Operative Instructions      DRESSING CARE:  1.	Please keep bandage ON and DRY until you return to the office for your 1st postoperative visit.   2.	In the shower you must cover bandage with a plastic bag. You can use tape or a rubber band so no water leaks into the bag.   3.	Please do not exercise as that leads to excessive sweating as the bandage will therefore become moist/ wet.    4.	Do not remove or change your bandage. You may apply more tape if dressing starts to unravel.   5.	Please do not insert any objects, such as a pencil, down into the bandage.     ELEVATION:  1.	Keep hand/wrist above heart level at all times or until bandage feels loose. This will help with swelling of the fingers and can be accomplished by using the FOAM PILLOW.   2.	 A sling will not hold your hand/wrist above your heart and therefore its use should be limited (it may also cause shoulder stiffness).     ACTIVITY:  1.	Moving your fingers daily after surgery is very important to prevent stiffness. Please open your fingers completely and close your fingers completely to achieve full range of motion.  **UNLESS TENDON REPAIR OR NERVE REPAIR SURGERY PERFORMED    2.	Move all joints of the extremity that are not immobilized to prevent stiffness (i.e. shoulder, elbow, fingers, and thumb unless instructed otherwise).   3.	Avoid activities which may re-injure your hand or finger.     DIET:   Regular diet. Start light and progress as tolerated.     PAIN MEDICINE:   1.	Pain medicine was sent to your pharmacy.  2.	Take pain medicine on an “AS NEEDED” basis according to your doctor’s instructions.   3.	Your pain will decrease over the next few days allowing you to:   •	Decrease your pain medicine quantity until you stop.   •	Increase the time between doses until you stop.   4.	You should not drink alcoholic beverages while on pain medication.   5.	Take pain medicine with food to prevent nausea.   6.	Constipation can occur. If no bowel movement occurs within 48 hours take a laxative of your choice (over the counter).	 	      CONTACT PHYSICIAN FOR:   Slight pain, swelling and bluish discoloration are to be expected. If you have breathing difficulty or chest pain dial 911 immediately. However, if the following symptoms occur notify your physician:   •	Temperature above 101° F 	        • Inability to urinate in 8 hours   •	Uncontrolled nausea/vomiting   	• Progressively increasing pain   •	Signs of wound infection 	                • Excessive bleeding  (Redness, swelling, pus-like drainage)     • Increasing numbness   •	Excessive swelling and tightness 	• Splint or cast that is too tight      OFFICE APPOINTMENT:    A staff member from the office will call you in the next 1-2 days to schedule your 1st Post Operative appointment to see your physician back in the office. *IF someone does not reach out to you in the next 1-2 days please call the office.      Patient Name _________________________________ Signature ______________________________ Date__________    Witness _____________________________________________________ Date ______________

## 2024-08-13 NOTE — ASU PATIENT PROFILE, ADULT - ABLE TO REACH PT
[phone]  Voicemail: arrive at time Dr Howard's office instructed and follow pre op instructions as per MD's office

## 2024-08-14 ENCOUNTER — OUTPATIENT (OUTPATIENT)
Dept: OUTPATIENT SERVICES | Facility: HOSPITAL | Age: 69
LOS: 1 days | Discharge: HOME | End: 2024-08-14

## 2024-08-14 ENCOUNTER — APPOINTMENT (OUTPATIENT)
Dept: ORTHOPEDIC SURGERY | Facility: AMBULATORY SURGERY CENTER | Age: 69
End: 2024-08-14

## 2024-08-14 VITALS
DIASTOLIC BLOOD PRESSURE: 93 MMHG | TEMPERATURE: 97 F | HEIGHT: 72 IN | WEIGHT: 190.48 LBS | HEART RATE: 63 BPM | SYSTOLIC BLOOD PRESSURE: 158 MMHG | RESPIRATION RATE: 15 BRPM | OXYGEN SATURATION: 98 %

## 2024-08-14 VITALS
RESPIRATION RATE: 16 BRPM | OXYGEN SATURATION: 96 % | TEMPERATURE: 97 F | HEART RATE: 60 BPM | DIASTOLIC BLOOD PRESSURE: 80 MMHG | SYSTOLIC BLOOD PRESSURE: 133 MMHG

## 2024-08-14 DIAGNOSIS — Z90.49 ACQUIRED ABSENCE OF OTHER SPECIFIED PARTS OF DIGESTIVE TRACT: Chronic | ICD-10-CM

## 2024-08-14 DIAGNOSIS — D36.10 BENIGN NEOPLASM OF PERIPHERAL NERVES AND AUTONOMIC NERVOUS SYSTEM, UNSPECIFIED: Chronic | ICD-10-CM

## 2024-08-14 PROCEDURE — 64831 REPAIR OF DIGIT NERVE: CPT | Mod: RT

## 2024-08-14 RX ORDER — LEVOTHYROXINE SODIUM 175 MCG
1 TABLET ORAL
Refills: 0 | DISCHARGE

## 2024-08-14 RX ORDER — ONDANSETRON HCL/PF 4 MG/2 ML
4 VIAL (ML) INJECTION ONCE
Refills: 0 | Status: DISCONTINUED | OUTPATIENT
Start: 2024-08-14 | End: 2024-08-14

## 2024-08-14 RX ORDER — ACETAMINOPHEN 500 MG
650 TABLET ORAL ONCE
Refills: 0 | Status: DISCONTINUED | OUTPATIENT
Start: 2024-08-14 | End: 2024-08-14

## 2024-08-14 NOTE — PRE-ANESTHESIA EVALUATION ADULT - NSATTENDATTESTRD_GEN_ALL_CORE
TCM complete.  Discharge date 10/26/23   2 attempts were made to reach patient to assess needs.  No return call as of this note.   If patient schedules follow up within 14 days of discharge, visit is TCM billable.  Message sent to practice clinical pool to reach out to patient and schedule an appointment within 7-13 days from discharge date.    If patient meets criteria for moderately complex & has follow-up within 14 days-can bill [id number].   If patient meets criteria for highly complex & has follow-up visit within 7 days-can bill [id number].    *virtual follow up needs modifier added (95 or GT)   *AWV AND TCM CAN BE BILLED TOGETHER WITH 25 MODIFIER    The patient has been re-examined and I agree with the above assessment or I updated with my findings.

## 2024-08-14 NOTE — ASU PREOP CHECKLIST - ALLERGY BAND ON
BP trending down  Patient undergoing cardiac work up with follow up cardiology appointment 10/29/19  Continue to monitor   no known allergies

## 2024-08-26 ENCOUNTER — APPOINTMENT (OUTPATIENT)
Dept: ORTHOPEDIC SURGERY | Facility: CLINIC | Age: 69
End: 2024-08-26
Payer: MEDICARE

## 2024-08-26 DIAGNOSIS — S61.212D LACERATION W/OUT FOREIGN BODY OF RIGHT MIDDLE FINGER W/OUT DAMAGE TO NAIL, SUBSEQUENT ENCOUNTER: ICD-10-CM

## 2024-08-26 PROBLEM — I48.91 UNSPECIFIED ATRIAL FIBRILLATION: Chronic | Status: ACTIVE | Noted: 2024-08-14

## 2024-08-26 PROBLEM — I10 ESSENTIAL (PRIMARY) HYPERTENSION: Chronic | Status: ACTIVE | Noted: 2024-08-14

## 2024-08-26 PROCEDURE — 99024 POSTOP FOLLOW-UP VISIT: CPT

## 2024-08-26 NOTE — HISTORY OF PRESENT ILLNESS
[___ Days Post Op] : post op day #[unfilled] [de-identified] : DOS: 8/14/24 [de-identified] : 12 days s/p Right 3rd digit microscopic repair of ulnar digital nerve. [de-identified] : The sutures are intact and there is no evidence of infection.  No change in sensation is from prior to surgery. [de-identified] : 12 days s/p Right 3rd digit microscopic repair of ulnar digital nerve. [de-identified] : Sutures were removed and the area was Steri-Stripped and home program outlined to reduce the risk of complications  We will continue immobilization for 9 more days and then he will start therapy.  Return to the office in 1 month

## 2024-08-26 NOTE — HISTORY OF PRESENT ILLNESS
[___ Days Post Op] : post op day #[unfilled] [de-identified] : DOS: 8/14/24 [de-identified] : 12 days s/p Right 3rd digit microscopic repair of ulnar digital nerve. [de-identified] : The sutures are intact and there is no evidence of infection.  No change in sensation is from prior to surgery. [de-identified] : 12 days s/p Right 3rd digit microscopic repair of ulnar digital nerve. [de-identified] : Sutures were removed and the area was Steri-Stripped and home program outlined to reduce the risk of complications  We will continue immobilization for 9 more days and then he will start therapy.  Return to the office in 1 month

## 2024-09-23 ENCOUNTER — APPOINTMENT (OUTPATIENT)
Dept: ORTHOPEDIC SURGERY | Facility: CLINIC | Age: 69
End: 2024-09-23
Payer: MEDICARE

## 2024-09-23 DIAGNOSIS — S61.212D LACERATION W/OUT FOREIGN BODY OF RIGHT MIDDLE FINGER W/OUT DAMAGE TO NAIL, SUBSEQUENT ENCOUNTER: ICD-10-CM

## 2024-09-23 PROCEDURE — 99024 POSTOP FOLLOW-UP VISIT: CPT

## 2024-09-23 NOTE — HISTORY OF PRESENT ILLNESS
[de-identified] : DOS: 08/14/2024 [de-identified] : 5 weeks, 5 days s/p right 3rd digit microscopic repair of ulnar digital nerve [de-identified] : Scar is well-healed with minimal sensitivity.  Range of motion 0/90 of the MP 0/110 of the PIP 0/60 of the DIP joint with active equaling passive range of motion.  Missing 20 degrees terminal flexion of the DIP joint.  Sensation is 5 mm on the radial side and 11 mm on the ulnar side [de-identified] : 5 weeks, 5 days s/p right 3rd digit microscopic repair of ulnar digital nerve [de-identified] : Patient will continue with therapy and transition to independent home program.  Return to the office in 2 to 3 months

## 2024-09-23 NOTE — HISTORY OF PRESENT ILLNESS
[de-identified] : DOS: 08/14/2024 [de-identified] : 5 weeks, 5 days s/p right 3rd digit microscopic repair of ulnar digital nerve [de-identified] : Scar is well-healed with minimal sensitivity.  Range of motion 0/90 of the MP 0/110 of the PIP 0/60 of the DIP joint with active equaling passive range of motion.  Missing 20 degrees terminal flexion of the DIP joint.  Sensation is 5 mm on the radial side and 11 mm on the ulnar side [de-identified] : 5 weeks, 5 days s/p right 3rd digit microscopic repair of ulnar digital nerve [de-identified] : Patient will continue with therapy and transition to independent home program.  Return to the office in 2 to 3 months

## (undated) DEVICE — DRAPE MICROSCOPE ZEISS

## (undated) DEVICE — SUT ETHILON 5-0 18" P-3

## (undated) DEVICE — SUT ETHILON 8-0 5" BV130-5

## (undated) DEVICE — GLV 8 PROTEXIS (WHITE)

## (undated) DEVICE — TONGUE DEPRESSOR

## (undated) DEVICE — PACK HAND

## (undated) DEVICE — MARKING PEN W RULER

## (undated) DEVICE — WARMING BLANKET LOWER ADULT

## (undated) DEVICE — TOURNIQUET CUFF 24" DUAL PORT SINGLE BLADDER W PLC (BLACK)

## (undated) DEVICE — DRSG KERLIX ROLL 4.5"

## (undated) DEVICE — TOURNIQUET CUFF 18" DUAL PORT SINGLE BLADDER W PLC  (BLACK)

## (undated) DEVICE — SUT ETHILON 9-0 5" BV100-4

## (undated) DEVICE — SUT VICRYL 4-0 18" PS-2 UNDYED

## (undated) DEVICE — MERCIAN VISABILITY BACKROUND YELLOW

## (undated) DEVICE — BLADE SCALPEL SAFETY #15 WITH PLASTIC GREEN HANDLE

## (undated) DEVICE — VENODYNE/SCD SLEEVE CALF MEDIUM

## (undated) DEVICE — NDL HYPO SAFE 25G X 5/8" (ORANGE)

## (undated) DEVICE — IMMOBILIZER HAND ALUMINUM LARGE

## (undated) DEVICE — BLADE SCALPEL SAFETY #11 WITH PLASTIC GREEN HANDLE

## (undated) DEVICE — SPEAR CELLULOSE 40410

## (undated) DEVICE — DRAPE TOWEL BLUE 17" X 24"